# Patient Record
Sex: MALE | Race: WHITE | Employment: FULL TIME | URBAN - NONMETROPOLITAN AREA
[De-identification: names, ages, dates, MRNs, and addresses within clinical notes are randomized per-mention and may not be internally consistent; named-entity substitution may affect disease eponyms.]

---
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
## 2020-05-22 ENCOUNTER — HOSPITAL ENCOUNTER (EMERGENCY)
Age: 20
Discharge: HOME | End: 2020-05-22
Attending: EMERGENCY MEDICINE
Payer: COMMERCIAL

## 2020-05-22 ENCOUNTER — APPOINTMENT (OUTPATIENT)
Dept: CT IMAGING | Age: 20
End: 2020-05-22
Payer: COMMERCIAL

## 2020-05-22 VITALS
BODY MASS INDEX: 25.77 KG/M2 | DIASTOLIC BLOOD PRESSURE: 88 MMHG | OXYGEN SATURATION: 99 % | HEART RATE: 88 BPM | TEMPERATURE: 98.1 F | RESPIRATION RATE: 18 BRPM | SYSTOLIC BLOOD PRESSURE: 149 MMHG | WEIGHT: 180 LBS | HEIGHT: 70 IN

## 2020-05-22 LAB
-: NORMAL
ABSOLUTE EOS #: 0.09 K/UL (ref 0–0.44)
ABSOLUTE IMMATURE GRANULOCYTE: <0.03 K/UL (ref 0–0.3)
ABSOLUTE LYMPH #: 2.07 K/UL (ref 1.1–3.7)
ABSOLUTE MONO #: 0.58 K/UL (ref 0.1–1.4)
AMORPHOUS: NORMAL
ANION GAP SERPL CALCULATED.3IONS-SCNC: 13 MMOL/L (ref 9–17)
BACTERIA: NORMAL
BASOPHILS # BLD: 1 % (ref 0–2)
BASOPHILS ABSOLUTE: 0.05 K/UL (ref 0–0.2)
BILIRUBIN URINE: NEGATIVE
BUN BLDV-MCNC: 12 MG/DL (ref 6–20)
BUN/CREAT BLD: 14 (ref 9–20)
CALCIUM SERPL-MCNC: 9.7 MG/DL (ref 8.6–10.4)
CASTS UA: NORMAL /LPF (ref 0–2)
CHLORIDE BLD-SCNC: 100 MMOL/L (ref 98–107)
CO2: 25 MMOL/L (ref 20–31)
COLOR: ABNORMAL
COMMENT UA: ABNORMAL
CREAT SERPL-MCNC: 0.86 MG/DL (ref 0.7–1.2)
CRYSTALS, UA: NORMAL /HPF
DIFFERENTIAL TYPE: ABNORMAL
EOSINOPHILS RELATIVE PERCENT: 1 % (ref 1–4)
EPITHELIAL CELLS UA: NORMAL /HPF (ref 0–5)
GFR AFRICAN AMERICAN: >60 ML/MIN
GFR NON-AFRICAN AMERICAN: >60 ML/MIN
GFR SERPL CREATININE-BSD FRML MDRD: ABNORMAL ML/MIN/{1.73_M2}
GFR SERPL CREATININE-BSD FRML MDRD: ABNORMAL ML/MIN/{1.73_M2}
GLUCOSE BLD-MCNC: 108 MG/DL (ref 70–99)
GLUCOSE URINE: NEGATIVE
HCT VFR BLD CALC: 43 % (ref 40.7–50.3)
HEMOGLOBIN: 14.6 G/DL (ref 13–17)
IMMATURE GRANULOCYTES: 0 %
KETONES, URINE: NEGATIVE
LEUKOCYTE ESTERASE, URINE: NEGATIVE
LYMPHOCYTES # BLD: 27 % (ref 25–45)
MCH RBC QN AUTO: 31.3 PG (ref 25.2–33.5)
MCHC RBC AUTO-ENTMCNC: 34 G/DL (ref 25.2–33.5)
MCV RBC AUTO: 92.3 FL (ref 82.6–102.9)
MONOCYTES # BLD: 8 % (ref 2–8)
MUCUS: NORMAL
NITRITE, URINE: NEGATIVE
NRBC AUTOMATED: ABNORMAL PER 100 WBC
OTHER OBSERVATIONS UA: NORMAL
PDW BLD-RTO: 12.4 % (ref 11.8–14.4)
PH UA: 7.5 (ref 5–6)
PLATELET # BLD: 225 K/UL (ref 138–453)
PLATELET ESTIMATE: ABNORMAL
PMV BLD AUTO: 10.9 FL (ref 8.1–13.5)
POTASSIUM SERPL-SCNC: 3.9 MMOL/L (ref 3.7–5.3)
PROTEIN UA: NEGATIVE
RBC # BLD: 4.66 M/UL (ref 4.21–5.77)
RBC # BLD: ABNORMAL 10*6/UL
RBC UA: NORMAL /HPF (ref 0–4)
RENAL EPITHELIAL, UA: NORMAL /HPF
SEG NEUTROPHILS: 64 % (ref 34–64)
SEGMENTED NEUTROPHILS ABSOLUTE COUNT: 4.95 K/UL (ref 1.5–8.1)
SODIUM BLD-SCNC: 138 MMOL/L (ref 135–144)
SPECIFIC GRAVITY UA: 1.02 (ref 1.01–1.02)
TRICHOMONAS: NORMAL
TURBIDITY: ABNORMAL
URINE HGB: NEGATIVE
UROBILINOGEN, URINE: NORMAL
WBC # BLD: 7.8 K/UL (ref 4.5–13.5)
WBC # BLD: ABNORMAL 10*3/UL
WBC UA: NORMAL /HPF (ref 0–4)
YEAST: NORMAL

## 2020-05-22 PROCEDURE — 85025 COMPLETE CBC W/AUTO DIFF WBC: CPT

## 2020-05-22 PROCEDURE — 81001 URINALYSIS AUTO W/SCOPE: CPT

## 2020-05-22 PROCEDURE — 80048 BASIC METABOLIC PNL TOTAL CA: CPT

## 2020-05-22 PROCEDURE — 74176 CT ABD & PELVIS W/O CONTRAST: CPT

## 2020-05-22 PROCEDURE — 36415 COLL VENOUS BLD VENIPUNCTURE: CPT

## 2020-05-22 PROCEDURE — 99284 EMERGENCY DEPT VISIT MOD MDM: CPT

## 2020-05-22 ASSESSMENT — PAIN SCALES - GENERAL: PAINLEVEL_OUTOF10: 8

## 2020-05-22 ASSESSMENT — PAIN DESCRIPTION - ORIENTATION: ORIENTATION: MID

## 2020-05-22 ASSESSMENT — PAIN DESCRIPTION - LOCATION: LOCATION: ABDOMEN

## 2020-05-22 NOTE — ED PROVIDER NOTES
read by the radiologist. Glenn Flores radiographic images are visualized and preliminarily interpreted by the emergency physician with the below findings:  CT ABDOMEN PELVIS WO CONTRAST   Preliminary Result   No acute abdominopelvic process. Other incidental findings as above.              LABS:  Results for orders placed or performed during the hospital encounter of 96/38/03   Basic Metabolic Panel   Result Value Ref Range    Glucose 108 (H) 70 - 99 mg/dL    BUN 12 6 - 20 mg/dL    CREATININE 0.86 0.70 - 1.20 mg/dL    Bun/Cre Ratio 14 9 - 20    Calcium 9.7 8.6 - 10.4 mg/dL    Sodium 138 135 - 144 mmol/L    Potassium 3.9 3.7 - 5.3 mmol/L    Chloride 100 98 - 107 mmol/L    CO2 25 20 - 31 mmol/L    Anion Gap 13 9 - 17 mmol/L    GFR Non-African American >60 >60 mL/min    GFR African American >60 >60 mL/min    GFR Comment          GFR Staging NOT REPORTED    CBC Auto Differential   Result Value Ref Range    WBC 7.8 4.5 - 13.5 k/uL    RBC 4.66 4.21 - 5.77 m/uL    Hemoglobin 14.6 13.0 - 17.0 g/dL    Hematocrit 43.0 40.7 - 50.3 %    MCV 92.3 82.6 - 102.9 fL    MCH 31.3 25.2 - 33.5 pg    MCHC 34.0 (H) 25.2 - 33.5 g/dL    RDW 12.4 11.8 - 14.4 %    Platelets 686 030 - 734 k/uL    MPV 10.9 8.1 - 13.5 fL    NRBC Automated NOT REPORTED 0.0 per 100 WBC    Differential Type NOT REPORTED     WBC Morphology NOT REPORTED     RBC Morphology NOT REPORTED     Platelet Estimate NOT REPORTED     Seg Neutrophils 64 34 - 64 %    Lymphocytes 27 25 - 45 %    Monocytes 8 2 - 8 %    Eosinophils % 1 1 - 4 %    Basophils 1 0 - 2 %    Immature Granulocytes 0 0 %    Segs Absolute 4.95 1.50 - 8.10 k/uL    Absolute Lymph # 2.07 1.10 - 3.70 k/uL    Absolute Mono # 0.58 0.10 - 1.40 k/uL    Absolute Eos # 0.09 0.00 - 0.44 k/uL    Basophils Absolute 0.05 0.00 - 0.20 k/uL    Absolute Immature Granulocyte <0.03 0.00 - 0.30 k/uL   Urinalysis Reflex to Culture   Result Value Ref Range    Color, UA NOT REPORTED YELLOW    Turbidity UA NOT REPORTED CLEAR    Glucose, Ur

## 2020-05-22 NOTE — ED NOTES
Pt given discharge instructions. Verbalized understanding and let ambulatory per self. Pt in stable condition.       Kailash Joel RN  05/22/20 [id number]

## 2020-06-02 ENCOUNTER — HOSPITAL ENCOUNTER (EMERGENCY)
Age: 20
Discharge: HOME | End: 2020-06-02
Attending: EMERGENCY MEDICINE
Payer: COMMERCIAL

## 2020-06-02 VITALS
SYSTOLIC BLOOD PRESSURE: 128 MMHG | BODY MASS INDEX: 26.54 KG/M2 | WEIGHT: 185 LBS | TEMPERATURE: 97.9 F | OXYGEN SATURATION: 99 % | HEART RATE: 88 BPM | RESPIRATION RATE: 12 BRPM | DIASTOLIC BLOOD PRESSURE: 76 MMHG

## 2020-06-02 PROCEDURE — 6360000002 HC RX W HCPCS: Performed by: EMERGENCY MEDICINE

## 2020-06-02 PROCEDURE — 96372 THER/PROPH/DIAG INJ SC/IM: CPT

## 2020-06-02 PROCEDURE — 99283 EMERGENCY DEPT VISIT LOW MDM: CPT

## 2020-06-02 RX ORDER — DIPHENHYDRAMINE HYDROCHLORIDE 50 MG/ML
50 INJECTION INTRAMUSCULAR; INTRAVENOUS ONCE
Status: COMPLETED | OUTPATIENT
Start: 2020-06-02 | End: 2020-06-02

## 2020-06-02 RX ORDER — PROMETHAZINE HYDROCHLORIDE 25 MG/ML
25 INJECTION, SOLUTION INTRAMUSCULAR; INTRAVENOUS ONCE
Status: COMPLETED | OUTPATIENT
Start: 2020-06-02 | End: 2020-06-02

## 2020-06-02 RX ADMIN — PROMETHAZINE HYDROCHLORIDE 25 MG: 25 INJECTION INTRAMUSCULAR; INTRAVENOUS at 14:54

## 2020-06-02 RX ADMIN — DIPHENHYDRAMINE HYDROCHLORIDE 50 MG: 50 INJECTION, SOLUTION INTRAMUSCULAR; INTRAVENOUS at 14:54

## 2020-06-02 ASSESSMENT — PAIN DESCRIPTION - ORIENTATION: ORIENTATION: UPPER

## 2020-06-02 ASSESSMENT — ENCOUNTER SYMPTOMS
VOMITING: 0
NAUSEA: 1
PHOTOPHOBIA: 0

## 2020-06-02 ASSESSMENT — PAIN DESCRIPTION - LOCATION: LOCATION: HEAD

## 2020-06-02 ASSESSMENT — PAIN DESCRIPTION - ONSET: ONSET: ON-GOING

## 2020-06-02 ASSESSMENT — PAIN - FUNCTIONAL ASSESSMENT
PAIN_FUNCTIONAL_ASSESSMENT: PREVENTS OR INTERFERES SOME ACTIVE ACTIVITIES AND ADLS
PAIN_FUNCTIONAL_ASSESSMENT: 0-10

## 2020-06-02 ASSESSMENT — PAIN DESCRIPTION - PAIN TYPE: TYPE: ACUTE PAIN

## 2020-06-02 ASSESSMENT — PAIN DESCRIPTION - FREQUENCY: FREQUENCY: CONTINUOUS

## 2020-06-02 ASSESSMENT — PAIN DESCRIPTION - DESCRIPTORS: DESCRIPTORS: STABBING;THROBBING

## 2020-06-02 ASSESSMENT — PAIN SCALES - GENERAL
PAINLEVEL_OUTOF10: 2
PAINLEVEL_OUTOF10: 2

## 2020-06-02 ASSESSMENT — PAIN DESCRIPTION - PROGRESSION: CLINICAL_PROGRESSION: NOT CHANGED

## 2020-06-02 NOTE — ED PROVIDER NOTES
[facility] ED  [address]  [place] [address]  Phone: [phone]        Pt Name: Suleman Mena  MRN: [id number]  Franciscogfneftali 2000  Date of evaluation: 6/2/20      CHIEF COMPLAINT     Chief Complaint   Patient presents with    Headache         HISTORY OF PRESENT ILLNESS  (Location/Symptom, Timing/Onset, Context/Setting, Quality, Duration, Modifying Factors, Severity.)    Suleman Mena is a 23 y.o. male who presents with a headache and nausea. The patient dates for 2 weeks he has been under a great deal of stress at work and he is noted that he has had some headaches he takes ibuprofen with partial relief today he had a headache he took ibuprofen which did give him some partial relief of his headache states he still has just a mild headache as well as some slight nausea no visual no hearing changes no numbness no weakness no recent trauma nothing else changes his symptoms no light sensitivity no stiff neck or rash no fever      REVIEW OF SYSTEMS    (2-9 systems for level 4, 10 or more for level 5)     Review of Systems   Constitutional: Negative for chills and fever. Eyes: Negative for photophobia and visual disturbance. Gastrointestinal: Positive for nausea. Negative for vomiting. Neurological: Positive for headaches. Negative for weakness and numbness. PAST MEDICAL HISTORY    has a past medical history of ADHD (attention deficit hyperactivity disorder), Fracture of distal radius and ulna, and Tobacco smoke exposure. SURGICAL HISTORY      has no past surgical history on file. CURRENTMEDICATIONS       Previous Medications    AMPHETAMINE-DEXTROAMPHETAMINE (ADDERALL XR) 20 MG XR CAPSULE    20 mg every morning     ONDANSETRON (ZOFRAN) 4 MG TABLET    One to two po Q 8 hours prn nausea. ALLERGIES     has No Known Allergies. FAMILY HISTORY     He indicated that his mother is alive. He indicated that his father is alive.  He indicated that his sister is deficits appreciated no cerebellar deficits         DIFFERENTIAL DIAGNOSIS/ MDM:     Patient presents with a headache he is neurologically intact he is requesting a work note he states he still has just a slight headache after taken ibuprofen so I will give him an injection of Phenergan and Benadryl to go with the Motrin he already took the patient is neurologically intact without any meningeal signs given this I do feel able to be followed up as an outpatient    DIAGNOSTIC RESULTS         LABS:  No results found for this visit on 06/02/20. EMERGENCY DEPARTMENT COURSE:   Vitals:    Vitals:    06/02/20 1426   BP: 136/83   Pulse: 95   Resp: 12   Temp: 97.9 °F (36.6 °C)   TempSrc: Tympanic   SpO2: 99%   Weight: 185 lb (83.9 kg)     -------------------------  BP: 136/83, Temp: 97.9 °F (36.6 °C), Pulse: 95, Resp: 12      RE-EVALUATION:  The patient presents with headache without signs of CNS bleed, stroke, infection, temporal arteritis, idiopathic intracranial hypertension, or other serious etiology. The patient is neurologically intact. Given the extremely low risk of these diagnoses further testing and evaluation for these possibilities does not appear to be indicated at this time. The patient appears stable for discharge and has been instructed to return immediately if the symptoms worsen in any way, or in 8-12 hr if not improved for re-evaluation. We also discussed returning to the Emergency Department immediately if new or worsening symptoms occur. We have discussed the symptoms which are most concerning (e.g., changing or worsening pain, visual or hearing changes, numbness or weakness, fever, stiff neck, or rash) that necessitate immediate return. The patient understands that at this time there is no evidence for a more malignant underlying process, but the patient also understands that early in the process of an illness or injury, an emergency department workup can be falsely reassuring.   Routine

## 2020-08-03 ENCOUNTER — HOSPITAL ENCOUNTER (OUTPATIENT)
Dept: LAB | Age: 20
Discharge: HOME | End: 2020-08-03
Payer: COMMERCIAL

## 2020-08-03 ENCOUNTER — OFFICE VISIT (OUTPATIENT)
Dept: INTERNAL MEDICINE | Age: 20
End: 2020-08-03
Payer: COMMERCIAL

## 2020-08-03 VITALS
HEIGHT: 70 IN | DIASTOLIC BLOOD PRESSURE: 70 MMHG | HEART RATE: 80 BPM | RESPIRATION RATE: 16 BRPM | TEMPERATURE: 97.4 F | WEIGHT: 180 LBS | SYSTOLIC BLOOD PRESSURE: 104 MMHG | BODY MASS INDEX: 25.77 KG/M2

## 2020-08-03 LAB — TSH SERPL DL<=0.05 MIU/L-ACNC: 1.72 MIU/L (ref 0.3–5)

## 2020-08-03 PROCEDURE — G0444 DEPRESSION SCREEN ANNUAL: HCPCS | Performed by: INTERNAL MEDICINE

## 2020-08-03 PROCEDURE — G8419 CALC BMI OUT NRM PARAM NOF/U: HCPCS | Performed by: INTERNAL MEDICINE

## 2020-08-03 PROCEDURE — 99213 OFFICE O/P EST LOW 20 MIN: CPT | Performed by: INTERNAL MEDICINE

## 2020-08-03 PROCEDURE — G8427 DOCREV CUR MEDS BY ELIG CLIN: HCPCS | Performed by: INTERNAL MEDICINE

## 2020-08-03 PROCEDURE — 84443 ASSAY THYROID STIM HORMONE: CPT

## 2020-08-03 PROCEDURE — G8431 POS CLIN DEPRES SCRN F/U DOC: HCPCS | Performed by: INTERNAL MEDICINE

## 2020-08-03 PROCEDURE — 4004F PT TOBACCO SCREEN RCVD TLK: CPT | Performed by: INTERNAL MEDICINE

## 2020-08-03 PROCEDURE — 36415 COLL VENOUS BLD VENIPUNCTURE: CPT

## 2020-08-03 RX ORDER — ESCITALOPRAM OXALATE 10 MG/1
10 TABLET ORAL DAILY
Qty: 30 TABLET | Refills: 3 | Status: SHIPPED | OUTPATIENT
Start: 2020-08-03 | End: 2020-09-17 | Stop reason: SDUPTHER

## 2020-08-03 SDOH — HEALTH STABILITY: MENTAL HEALTH: HOW MANY STANDARD DRINKS CONTAINING ALCOHOL DO YOU HAVE ON A TYPICAL DAY?: 5 OR 6

## 2020-08-03 SDOH — HEALTH STABILITY: MENTAL HEALTH: HOW OFTEN DO YOU HAVE A DRINK CONTAINING ALCOHOL?: 2-4 TIMES A MONTH

## 2020-08-03 ASSESSMENT — PATIENT HEALTH QUESTIONNAIRE - PHQ9
2. FEELING DOWN, DEPRESSED OR HOPELESS: 1
SUM OF ALL RESPONSES TO PHQ9 QUESTIONS 1 & 2: 4
9. THOUGHTS THAT YOU WOULD BE BETTER OFF DEAD, OR OF HURTING YOURSELF: 0
8. MOVING OR SPEAKING SO SLOWLY THAT OTHER PEOPLE COULD HAVE NOTICED. OR THE OPPOSITE, BEING SO FIGETY OR RESTLESS THAT YOU HAVE BEEN MOVING AROUND A LOT MORE THAN USUAL: 3
SUM OF ALL RESPONSES TO PHQ QUESTIONS 1-9: 14
5. POOR APPETITE OR OVEREATING: 0
7. TROUBLE CONCENTRATING ON THINGS, SUCH AS READING THE NEWSPAPER OR WATCHING TELEVISION: 0
1. LITTLE INTEREST OR PLEASURE IN DOING THINGS: 3
3. TROUBLE FALLING OR STAYING ASLEEP: 1
6. FEELING BAD ABOUT YOURSELF - OR THAT YOU ARE A FAILURE OR HAVE LET YOURSELF OR YOUR FAMILY DOWN: 3
SUM OF ALL RESPONSES TO PHQ QUESTIONS 1-9: 14
4. FEELING TIRED OR HAVING LITTLE ENERGY: 3
10. IF YOU CHECKED OFF ANY PROBLEMS, HOW DIFFICULT HAVE THESE PROBLEMS MADE IT FOR YOU TO DO YOUR WORK, TAKE CARE OF THINGS AT HOME, OR GET ALONG WITH OTHER PEOPLE: 2

## 2020-08-03 NOTE — PROGRESS NOTES
[place] [address]  Dept: [phone]  Dept Fax: [phone]  Loc: [phone]     Visit Date:  8/3/2020    Patient:  Haris Davis  YOB: 2000  Provider: Favian Kramer MD        HPI:   He was seen in the internal medicine office today for   Chief Complaint   Patient presents with    New Patient          Presents to establish care. Says that he has been feeling anxious for the last several years. Says that the anxiety happens on almost daily basis. Does not mention any specific trigger, but says that social situations often put him in discomfort. Says that he is still able to go to work, but that he often has trouble at work dealing with people. Denies any history of thyroid issues. Says that he also might be feeling depressed as well. Says that his sleep is fine and his appetite is fine to. Denies any suicidal or homicidal ideas. Says that he was seen in the emergency room for abdominal pain, and had a CT scan done that was negative, and he was told that his symptoms were probably due to anxiety. Denies any fever, chills, abdominal pain, nausea, vomiting at this time    Subjective:      REVIEW OF SYSTEMS    Constitutional: Denies fever, chills  Eyes: Denies recent vision changes  Cardiovascular: Denies chest pain, LL edema  Respiratory: Denies cough, wheezes  Gastrointestinal: Denies abdominal pain, nausea, vomiting  Skin: Denies rash  Endocrine: Denies polyuria  Hematologic: Denies bruising  Genitourinary: Denies dysuria, hematuria  Neurological: Denies headache, numbness        Medications    Current Outpatient Medications:     escitalopram (LEXAPRO) 10 MG tablet, Take 1 tablet by mouth daily, Disp: 30 tablet, Rfl: 3    ondansetron (ZOFRAN) 4 MG tablet, One to two po Q 8 hours prn nausea.  (Patient not taking: Reported on 8/3/2020), Disp: 20 tablet, Rfl: 0   amphetamine-dextroamphetamine (ADDERALL XR) 20 MG XR capsule, 20 mg every morning , Disp: , Rfl:     The patient has No Known Allergies. Past Medical History  Ariadna  has a past medical history of ADHD (attention deficit hyperactivity disorder), Anxiety, Depression, Fracture of distal radius and ulna, and Tobacco smoke exposure. Past Surgical History  The patient  has no past surgical history on file. Family History  This patient's family history includes Cancer in his father and other family members; Diabetes in his maternal grandmother and mother; Heart Disease in an other family member. Social History  Ariadna  reports that he has been smoking cigarettes. He has been smoking about 0.25 packs per day. He has never used smokeless tobacco. He reports current alcohol use of about 4.0 - 6.0 standard drinks of alcohol per week. He reports that he does not use drugs. Health Maintenance:    Health Maintenance   Topic Date Due    Pneumococcal 0-64 years Vaccine (1 of 1 - PPSV23) 04/16/2005    HIV screen  04/16/2014    Flu vaccine (1) 09/01/2020    DTaP/Tdap/Td vaccine (7 - Td) 08/17/2021    Hepatitis A vaccine  Completed    Hepatitis B vaccine  Completed    Hib vaccine  Completed    HPV vaccine  Completed    Varicella vaccine  Completed    Meningococcal (ACWY) vaccine  Completed           Objective:     PHYSICAL EXAM  /70 (Site: Left Upper Arm, Position: Sitting, Cuff Size: Medium Adult)   Pulse 80   Temp 97.4 °F (36.3 °C) (Infrared)   Resp 16   Ht 5' 10\" (1.778 m)   Wt 180 lb (81.6 kg)   BMI 25.83 kg/m²   Constitutional: No acute distress. Sits in chair comfortably  Eyes: Sclerae nonicteric. No lid lag or proptosis  HENT: External ears normal. No external lesions on the nose  Neck: No gross masses. Trachea visibly midline  Respiratory: Good air entry bilateral.  No wheezing or crackles  Cardiovascular: Normal S1-S2. No murmurs.   No lower extremity edema  Gastrointestinal: No visible masses. No visible hernias  Skin: No abnormal rashes. No abnormal masses  Neurologic: Cranial nerves grossly intact  Psychiatric: Normal affect. Alert and oriented        Labs Reviewed 8/3/2020:    Lab Results   Component Value Date    WBC 7.8 05/22/2020    HGB 14.6 05/22/2020    HCT 43.0 05/22/2020     05/22/2020    ALT 24 03/30/2016    AST 21 03/30/2016     05/22/2020    K 3.9 05/22/2020     05/22/2020    CREATININE 0.86 05/22/2020    BUN 12 05/22/2020    CO2 25 05/22/2020    TSH 1.72 08/03/2020       Plan        Anxiety: Given the frequency of his symptoms, as well as the apparent effect on his day-to-day living, will prescribe Lexapro. Reassess next day. Diagnosis Orders   1. Positive depression screening  Positive Screen for Clinical Depression with a Documented Follow-up Plan    2. Anxiety  TSH with Reflex         No follow-ups on file. Patient given educational materials - see patient instructions. Discussed use, benefit, and side effects of prescribed medications. All patient questions answered. Pt voiced understanding. Reviewed health maintenance. Electronically signed Maria Fernanda Bartlett MD on 8/3/2020 at 3:22 PM      This note has been created using the Epic electronic health record, and dictated in part by [employer] dictation system. Despite the documenting physician's best efforts, there may be errors in spelling, grammar or syntax. On the basis of positive PHQ-9 screening (PHQ-9 Total Score: 14), the following plan was implemented: Will prescribe Lexapro. Reassess next visit. .  Patient will follow-up in 6 week(s) with PCP.

## 2020-09-16 ENCOUNTER — OFFICE VISIT (OUTPATIENT)
Dept: INTERNAL MEDICINE | Age: 20
End: 2020-09-16
Payer: COMMERCIAL

## 2020-09-16 VITALS
TEMPERATURE: 97 F | HEIGHT: 70 IN | BODY MASS INDEX: 24.34 KG/M2 | HEART RATE: 80 BPM | DIASTOLIC BLOOD PRESSURE: 68 MMHG | SYSTOLIC BLOOD PRESSURE: 118 MMHG | RESPIRATION RATE: 16 BRPM | WEIGHT: 170 LBS

## 2020-09-16 PROCEDURE — 99213 OFFICE O/P EST LOW 20 MIN: CPT | Performed by: INTERNAL MEDICINE

## 2020-09-16 PROCEDURE — G8427 DOCREV CUR MEDS BY ELIG CLIN: HCPCS | Performed by: INTERNAL MEDICINE

## 2020-09-16 PROCEDURE — G8420 CALC BMI NORM PARAMETERS: HCPCS | Performed by: INTERNAL MEDICINE

## 2020-09-16 PROCEDURE — 4004F PT TOBACCO SCREEN RCVD TLK: CPT | Performed by: INTERNAL MEDICINE

## 2020-09-16 NOTE — LETTER
[facility] Internal Med A department of [facility]  Phone: [phone]  Fax: [phone]    Favian Kramer MD        September 16, 2020     Patient: Haris Davis   YOB: 2000   Date of Visit: 9/16/2020       To Whom It May Concern:    Mr Fahad Garcia is under my care at [facility] and has come to clinic for an appointment on 9/16/2020    If you have any questions or concerns, please don't hesitate to call.     Sincerely,        Marci ALEJANDRE MD

## 2020-09-17 RX ORDER — ESCITALOPRAM OXALATE 20 MG/1
20 TABLET ORAL DAILY
Qty: 30 TABLET | Refills: 1 | Status: SHIPPED | OUTPATIENT
Start: 2020-09-17 | End: 2020-11-16

## 2020-09-17 NOTE — PROGRESS NOTES
radius and ulna, and Tobacco smoke exposure. Past Surgical History  The patient  has no past surgical history on file. Family History  This patient's family history includes Cancer in his father and other family members; Diabetes in his maternal grandmother and mother; Heart Disease in an other family member. Social History  Sindhu Valentin  reports that he has been smoking cigarettes. He has been smoking about 0.25 packs per day. He has never used smokeless tobacco. He reports current alcohol use of about 4.0 - 6.0 standard drinks of alcohol per week. He reports that he does not use drugs. Health Maintenance:    Health Maintenance   Topic Date Due    Pneumococcal 0-64 years Vaccine (1 of 1 - PPSV23) 04/16/2005    HIV screen  04/16/2014    Flu vaccine (1) 09/01/2020    DTaP/Tdap/Td vaccine (7 - Td) 08/17/2021    Hepatitis A vaccine  Completed    Hepatitis B vaccine  Completed    Hib vaccine  Completed    HPV vaccine  Completed    Varicella vaccine  Completed    Meningococcal (ACWY) vaccine  Completed           Objective:     PHYSICAL EXAM  /68 (Site: Left Upper Arm, Position: Sitting, Cuff Size: Medium Adult)   Pulse 80   Temp 97 °F (36.1 °C)   Resp 16   Ht 5' 10\" (1.778 m)   Wt 170 lb (77.1 kg)   BMI 24.39 kg/m²   Constitutional: No acute distress. Sits in chair comfortably  Eyes: Sclerae nonicteric. No lid lag or proptosis  HENT: External ears normal. No external lesions on the nose  Neck: No gross masses. Trachea visibly midline  Respiratory: Good air entry bilaterally. No wheezing or crackles  Cardiovascular: Normal S1-S2. No murmurs. No lower extremity edema  Gastrointestinal: No visible masses. No visible hernias  Skin: No abnormal rashes. No abnormal masses  Neurologic: Cranial nerves grossly intact  Psychiatric: Normal affect.  Alert and oriented        Labs Reviewed 9/16/2020:    Lab Results   Component Value Date    WBC 7.8 05/22/2020    HGB 14.6 05/22/2020    HCT 43.0 05/22/2020     05/22/2020    ALT 24 03/30/2016    AST 21 03/30/2016     05/22/2020    K 3.9 05/22/2020     05/22/2020    CREATININE 0.86 05/22/2020    BUN 12 05/22/2020    CO2 25 05/22/2020    TSH 1.72 08/03/2020       Plan        Anxiety: Already on Lexapro. Says that he is been feeling tired for the last couple of days. It is possible that he also feels depressed as well given that he has lack of interest in day-to-day activities. Advised that we will increase Lexapro 20 mg, and he agrees with the plan. Reassess next visit. Diagnosis Orders   1. Fatigue, unspecified type  CBC Auto Differential    Comprehensive Metabolic Panel    Vitamin B12    Ferritin   2. Anxiety     3. Positive depression screening           No follow-ups on file. Patient given educational materials - see patient instructions. Discussed use, benefit, and side effects of prescribed medications. All patient questions answered. Pt voiced understanding. Reviewed health maintenance. Electronically signed Belinda Alonso MD on 9/16/2020 at 12:35 AM      This note has been created using the Epic electronic health record, and dictated in part by [employer] dictation system. Despite the documenting physician's best efforts, there may be errors in spelling, grammar or syntax.